# Patient Record
Sex: FEMALE | Race: WHITE | NOT HISPANIC OR LATINO | Employment: STUDENT | URBAN - NONMETROPOLITAN AREA
[De-identification: names, ages, dates, MRNs, and addresses within clinical notes are randomized per-mention and may not be internally consistent; named-entity substitution may affect disease eponyms.]

---
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
## 2025-04-02 ENCOUNTER — OFFICE VISIT (OUTPATIENT)
Dept: URGENT CARE | Facility: CLINIC | Age: 20
End: 2025-04-02
Payer: COMMERCIAL

## 2025-04-02 VITALS
TEMPERATURE: 97 F | BODY MASS INDEX: 35.85 KG/M2 | WEIGHT: 210 LBS | HEART RATE: 86 BPM | HEIGHT: 64 IN | SYSTOLIC BLOOD PRESSURE: 112 MMHG | DIASTOLIC BLOOD PRESSURE: 78 MMHG | OXYGEN SATURATION: 99 % | RESPIRATION RATE: 16 BRPM

## 2025-04-02 DIAGNOSIS — S46.212A STRAIN OF LEFT BICEPS MUSCLE, INITIAL ENCOUNTER: Primary | ICD-10-CM

## 2025-04-02 PROCEDURE — G0382 LEV 3 HOSP TYPE B ED VISIT: HCPCS

## 2025-04-02 PROCEDURE — S9083 URGENT CARE CENTER GLOBAL: HCPCS

## 2025-04-02 RX ORDER — METHYLPREDNISOLONE 4 MG/1
TABLET ORAL
Qty: 21 EACH | Refills: 0 | Status: SHIPPED | OUTPATIENT
Start: 2025-04-02

## 2025-04-02 NOTE — PROGRESS NOTES
"  [facility]        NAME: Marlyn Ignacio is a 20 y.o. adult  : 2005    MRN: [id number]  DATE: 2025  TIME: 5:33 PM    Assessment and Plan   Strain of left biceps muscle, initial encounter [S46.212A]  1. Strain of left biceps muscle, initial encounter  methylPREDNISolone 4 MG tablet therapy pack        Patient reporting \"seizures\" are not unusual behavior and presents for arm pain only. Will treat with short course of oral steroids for radicular symptoms. Encouraged continued supportive measures.  Follow up with PCP in 3-5 days or proceed to emergency department for worsening symptoms.  Patient verbalized understanding of instructions given.       Patient Instructions     Take oral steroids as prescribed  OTC Tylenol as needed for pain  Heat/Ice  Topical analgesics  Follow up with PCP in 3-5 days.  Proceed to  ER if symptoms worsen.    If tests have been performed at [facility], our office will contact you with results if changes need to be made to the care plan discussed with you at the visit.  You can review your full results on [facility].    Chief Complaint     Chief Complaint   Patient presents with    pain in left bicep area     Developed pain in left bicep area 1 day ago- started as left arm was shaking and turned\"numb' > she then had an overall body seizure lasting a couple of minutes and pain got worse in left bicep afterwards         History of Present Illness       20-year-old transgender male with a past medical history significant for psychogenic nonepileptic seizures presents with complaints of left upper arm pain x 2 days.  Patient reports developing left biceps pain yesterday and then having a \"seizure\" that has since worsened pain.  He states that seizures are often triggered by emotional distress and this is not unusual for patient as he has been weaned off all antiepileptic medications and no longer follows with neurology.  States having these episodes since he was " "age 6.  He cannot recall specifics of yesterdays events but noticed the left arm pain with intermittent numbness and tingling radiating down LUE. No obvious injury or trauma but does lift repetitively at work. No bruising or swelling. No OTC medications. He is right-hand dominant.         Review of Systems   Review of Systems   Constitutional:  Negative for chills and fever.   Respiratory:  Negative for cough, shortness of breath and wheezing.    Cardiovascular:  Negative for chest pain.   Gastrointestinal:  Negative for abdominal pain, diarrhea, nausea and vomiting.   Musculoskeletal:  Positive for myalgias. Negative for joint swelling.   Skin:  Negative for color change and rash.   Neurological:  Positive for seizures and numbness.         Current Medications       Current Outpatient Medications:     methylPREDNISolone 4 MG tablet therapy pack, Use as directed on package, Disp: 21 each, Rfl: 0    Current Allergies     Allergies as of 04/02/2025    (No Known Allergies)            The following portions of the patient's history were reviewed and updated as appropriate: allergies, current medications, past family history, past medical history, past social history, past surgical history and problem list.     Past Medical History:   Diagnosis Date    Anxiety     Depression     Seizure (HCC) 01/2021    Seizures (HCC)     psychogenic non epilieptic seizure       Past Surgical History:   Procedure Laterality Date    NO PAST SURGERIES         Family History   Problem Relation Age of Onset    No Known Problems Mother     No Known Problems Father          Medications have been verified.        Objective   /78   Pulse 86   Temp (!) 97 °F (36.1 °C)   Resp 16   Ht 5' 4\" (1.626 m)   Wt 95.3 kg (210 lb)   LMP 03/31/2025   SpO2 99%   BMI 36.05 kg/m²   Patient's last menstrual period was 03/31/2025.       Physical Exam     Physical Exam  Vitals and nursing note reviewed.   Constitutional:       General: He is not in " acute distress.     Appearance: He is not toxic-appearing.   HENT:      Head: Normocephalic.   Eyes:      Conjunctiva/sclera: Conjunctivae normal.   Pulmonary:      Effort: Pulmonary effort is normal.   Musculoskeletal:         General: Tenderness present. No swelling.      Left shoulder: Normal. Normal range of motion.      Left upper arm: Tenderness present. No swelling or bony tenderness.      Left elbow: Normal.      Left forearm: Normal.      Left wrist: Normal.      Left hand: Normal.      Comments: TTP over left biceps muscle without palpable defect. Palpable produces distal symptoms.    Skin:     General: Skin is warm and dry.   Neurological:      Mental Status: He is alert and oriented to person, place, and time.      Sensory: Sensation is intact.      Motor: Motor function is intact.      Gait: Gait is intact.   Psychiatric:         Mood and Affect: Mood normal.         Behavior: Behavior normal.

## 2025-04-02 NOTE — PATIENT INSTRUCTIONS
Take oral steroids as prescribed  OTC Tylenol as needed for pain  Heat/Ice  Topical analgesics  Follow up with PCP in 3-5 days.  Proceed to  ER if symptoms worsen.    If tests have been performed at [facility], our office will contact you with results if changes need to be made to the care plan discussed with you at the visit.  You can review your full results on [facility] MyChart.